# Patient Record
Sex: FEMALE | URBAN - METROPOLITAN AREA
[De-identification: names, ages, dates, MRNs, and addresses within clinical notes are randomized per-mention and may not be internally consistent; named-entity substitution may affect disease eponyms.]

---
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
## 2021-03-02 ENCOUNTER — RX ONLY (RX ONLY)
Age: 21
End: 2021-03-02

## 2021-03-02 RX ORDER — TRETIONIN 0.5 MG/G
.05% CREAM TOPICAL
Qty: 1 | Refills: 4 | Status: ERX